# Patient Record
Sex: FEMALE | Race: BLACK OR AFRICAN AMERICAN | NOT HISPANIC OR LATINO | Employment: STUDENT | URBAN - METROPOLITAN AREA
[De-identification: names, ages, dates, MRNs, and addresses within clinical notes are randomized per-mention and may not be internally consistent; named-entity substitution may affect disease eponyms.]

---
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
## 2024-03-29 ENCOUNTER — HOSPITAL ENCOUNTER (EMERGENCY)
Facility: HOSPITAL | Age: 19
Discharge: HOME | End: 2024-03-29
Attending: EMERGENCY MEDICINE
Payer: MEDICAID

## 2024-03-29 VITALS
SYSTOLIC BLOOD PRESSURE: 142 MMHG | HEIGHT: 70 IN | OXYGEN SATURATION: 100 % | DIASTOLIC BLOOD PRESSURE: 89 MMHG | HEART RATE: 86 BPM | RESPIRATION RATE: 18 BRPM | TEMPERATURE: 98 F | BODY MASS INDEX: 36.36 KG/M2 | WEIGHT: 254 LBS

## 2024-03-29 DIAGNOSIS — H60.93 OTITIS EXTERNA OF BOTH EARS, UNSPECIFIED CHRONICITY, UNSPECIFIED TYPE: Primary | ICD-10-CM

## 2024-03-29 DIAGNOSIS — H66.002 ACUTE SUPPURATIVE OTITIS MEDIA OF LEFT EAR WITHOUT SPONTANEOUS RUPTURE OF TYMPANIC MEMBRANE, RECURRENCE NOT SPECIFIED: ICD-10-CM

## 2024-03-29 PROCEDURE — 99284 EMERGENCY DEPT VISIT MOD MDM: CPT

## 2024-03-29 RX ORDER — CIPROFLOXACIN AND DEXAMETHASONE 3; 1 MG/ML; MG/ML
4 SUSPENSION/ DROPS AURICULAR (OTIC) 2 TIMES DAILY
Qty: 7.5 ML | Refills: 0 | Status: SHIPPED | OUTPATIENT
Start: 2024-03-29 | End: 2024-04-08

## 2024-03-29 RX ORDER — AMOXICILLIN AND CLAVULANATE POTASSIUM 875; 125 MG/1; MG/1
1 TABLET, FILM COATED ORAL 2 TIMES DAILY
Qty: 20 TABLET | Refills: 0 | Status: SHIPPED | OUTPATIENT
Start: 2024-03-29 | End: 2024-04-08

## 2024-03-29 NOTE — ED PROVIDER NOTES
Encounter Date: 3/29/2024       History     Chief Complaint   Patient presents with    Otalgia     Bilateral ear pain and throbbing x 2-3 days.      Patient is a 18 y.o. female who presents with bilateral ear pain and nasal congestion X 2-3 days.  Patient has a history of acute otitis externa.  Denies any discharge from ears.  States that she has been using over-the-counter ear drops without full resolution of the symptoms.  Denies fever, headache, chest pain, sore throat, shortness of breath, wheezing, stridor, drooling, NVD, abdominal pain, constipation, urinary problems, joint problems, rashes, or any other complaints at this time.      The history is provided by the patient.     Review of patient's allergies indicates:  No Known Allergies  No past medical history on file.  No past surgical history on file.  Family History   Problem Relation Age of Onset    No Known Problems Mother     No Known Problems Father      Social History     Tobacco Use    Smoking status: Never    Smokeless tobacco: Never   Substance Use Topics    Alcohol use: No    Drug use: No     Review of Systems   Constitutional:  Negative for chills and fever.   HENT:  Positive for congestion, ear pain, postnasal drip and rhinorrhea. Negative for drooling, ear discharge, facial swelling, sore throat, trouble swallowing and voice change.    Respiratory:  Negative for shortness of breath and wheezing.    Cardiovascular:  Negative for chest pain.   Gastrointestinal:  Negative for abdominal distention, abdominal pain, diarrhea, nausea and vomiting.   Genitourinary:  Negative for dysuria.   Musculoskeletal:  Negative for back pain, neck pain and neck stiffness.   Skin:  Negative for rash.   Neurological:  Negative for weakness and headaches.   Hematological:  Does not bruise/bleed easily.   All other systems reviewed and are negative.      Physical Exam     Initial Vitals [03/29/24 0858]   BP Pulse Resp Temp SpO2   (!) 142/89 86 18 98.4 °F (36.9 °C) 100  %      MAP       --         Physical Exam    Vitals reviewed.  Constitutional: She appears well-developed and well-nourished.   HENT:   Head: Normocephalic and atraumatic.   Nose: Rhinorrhea present.   Mouth/Throat: Uvula is midline. No oropharyngeal exudate, posterior oropharyngeal edema or posterior oropharyngeal erythema.   Bilateral tragus tenderness.  No tenderness, redness, warmth to the mastoid processes, bilaterally.  Both ear canals are significantly erythematous and swollen.  Unable to visualize left tympanic membrane.  Limited view of right tympanic membrane, however, TM appears red and bulging.    2+ tonsils, bilaterally.  Tonsils symmetrical. Tonsils are not erythematous.  There is not tonsillar exudate.  No apparent PTA. Uvula midline.  There is not cervical adenopathy. No Tae's angina.     Eyes: EOM are normal.   Neck:   Normal range of motion.  Cardiovascular:  Normal rate and regular rhythm.           Pulmonary/Chest: Breath sounds normal. No respiratory distress. She has no wheezes. She has no rhonchi. She has no rales.   Abdominal: Abdomen is soft. She exhibits no distension. There is no abdominal tenderness. There is no rebound and no guarding.   Musculoskeletal:      Cervical back: Normal range of motion.     Neurological: She is alert and oriented to person, place, and time.         ED Course   Procedures  Labs Reviewed - No data to display       Imaging Results    None          Medications - No data to display  Medical Decision Making  Patient is an afebrile, well-appearing 18 y.o. female with bilateral ear pain . VSS.  No tenderness, warmth, erythema, auricular protrusion to suggest mastoiditis.  Denies fever, chest pain, SOB, wheezing, difficulty swallowing, neck pain, neck stiffness. Vital signs do not suggest sepsis. Lung sounds are clear and not consistent with pneumonia. There is no neck pain or limited ROM to suggest retropharyngeal abscess or meningitis.  Uvula midline and examined  throat not consistent with peritonsillar abscess.  Tolerating PO, non-toxic appearing, no hypoxia. The patient remained comfortable and stable during their visit in the ED.  Details of ED course documented in ED workup.     Differential Diagnosis includes, but is not limited to:  Acute otitis media, acute otitis externa, mastoiditis    All historical, clinical, and laboratory findings reviewed. Patient with constellation of symptoms most consistent with a acute otitis media and acute otitis externa.  Ear canals significantly swollen and erythematous, placed a wick in each ear.  Patient prescribed Ciprodex drops and Augmentin.  There are no concerning features on physical exam to suggest an emergent or life threatening condition or an invasive bacterial infection, including, but not limited to:  Mastoiditis or peritonsillar abscess. No further intervention is indicated at this time. The patient is at low risk for an emergent/life threatening medical condition at this time, and I am of the belief that that it is safe to discharge the patient from the emergency department.     Patient instructed to follow up with PCP in 2-3 days for recheck of today's complaints.  I have also placed a referral to [facility] Nose [facility] Thro[facility].  Patient has been counseled regarding the need for follow-up as well as the indications to return to the emergency room should new or worrisome developments. Discharge and follow-up instructions discussed with the patient who expressed understanding and willingness to comply with recommendations. Patient discharged from the emergency department in stable condition, in no acute distress.     Risk  Prescription drug management.               ED Course as of 03/31/24 1153   Fri Mar 29, 2024   0933 Patient examined and assessed. Patient answering questions appropriately, speaking in complete sentences, respirations are even and unlabored.  AAO x 4.     Bilateral ear pain.  Physical exam consistent with acute  otitis externa.  We will place wick in each ear canal. [OB]      ED Course User Index  [OB] Thea Carey PA-C                           Clinical Impression:  Final diagnoses:  [H60.93] Otitis externa of both ears, unspecified chronicity, unspecified type (Primary)  [H66.002] Acute suppurative otitis media of left ear without spontaneous rupture of tympanic membrane, recurrence not specified          ED Disposition Condition    Discharge Stable          ED Prescriptions       Medication Sig Dispense Start Date End Date Auth. Provider    ciprofloxacin-dexAMETHasone 0.3-0.1% (CIPRODEX) 0.3-0.1 % DrpS Place 4 drops into both ears 2 (two) times daily. for 10 days 7.5 mL 3/29/2024 4/8/2024 Thea Carey PA-C    amoxicillin-clavulanate 875-125mg (AUGMENTIN) 875-125 mg per tablet Take 1 tablet by mouth 2 (two) times daily. for 10 days 20 tablet 3/29/2024 4/8/2024 Thae Carey PA-C          Follow-up Information       Follow up With Specialties Details Why Contact Info    Tony Faulkner, DO Family Medicine In 3 days As needed, If symptoms worsen [id number] [facility] [id number]  [phone]               Thea Carey PA-C  03/31/24 4435

## 2024-03-29 NOTE — Clinical Note
"Cheryl Kaplanjorge alberto Gonzalez was seen and treated in our emergency department on 3/29/2024.  She may return to work on 04/01/2024.       If you have any questions or concerns, please don't hesitate to call.      Thea Carey PA-C"